# Patient Record
Sex: MALE | Race: WHITE | NOT HISPANIC OR LATINO | Employment: OTHER | URBAN - METROPOLITAN AREA
[De-identification: names, ages, dates, MRNs, and addresses within clinical notes are randomized per-mention and may not be internally consistent; named-entity substitution may affect disease eponyms.]

---

## 2017-02-15 ENCOUNTER — HOSPITAL ENCOUNTER (OUTPATIENT)
Dept: RADIOLOGY | Facility: HOSPITAL | Age: 67
Discharge: HOME | End: 2017-02-15
Attending: INTERNAL MEDICINE
Payer: MEDICARE

## 2017-02-15 ENCOUNTER — OFFICE VISIT (OUTPATIENT)
Dept: PULMONOLOGY | Facility: CLINIC | Age: 67
End: 2017-02-15
Payer: MEDICARE

## 2017-02-15 VITALS
RESPIRATION RATE: 12 BRPM | WEIGHT: 186.94 LBS | BODY MASS INDEX: 25.32 KG/M2 | OXYGEN SATURATION: 99 % | HEIGHT: 72 IN | HEART RATE: 55 BPM

## 2017-02-15 DIAGNOSIS — J84.10 GRANULOMATOUS LUNG DISEASE: ICD-10-CM

## 2017-02-15 DIAGNOSIS — R93.89 ABNORMAL CT SCAN, CHEST: Primary | ICD-10-CM

## 2017-02-15 DIAGNOSIS — J30.9 ALLERGIC RHINITIS, UNSPECIFIED ALLERGIC RHINITIS TRIGGER, UNSPECIFIED RHINITIS SEASONALITY: ICD-10-CM

## 2017-02-15 DIAGNOSIS — R93.89 ABNORMAL CT SCAN, CHEST: ICD-10-CM

## 2017-02-15 DIAGNOSIS — J45.30 MILD PERSISTENT ASTHMA WITHOUT COMPLICATION: ICD-10-CM

## 2017-02-15 PROCEDURE — 99999 PR PBB SHADOW E&M-EST. PATIENT-LVL III: CPT | Mod: PBBFAC,,, | Performed by: INTERNAL MEDICINE

## 2017-02-15 PROCEDURE — 71250 CT THORAX DX C-: CPT | Mod: TC

## 2017-02-15 PROCEDURE — 71250 CT THORAX DX C-: CPT | Mod: 26,,, | Performed by: RADIOLOGY

## 2017-02-15 PROCEDURE — 99214 OFFICE O/P EST MOD 30 MIN: CPT | Mod: S$PBB,,, | Performed by: INTERNAL MEDICINE

## 2017-02-15 NOTE — MR AVS SNAPSHOT
[facility]  [address]  [facility] [address]  Phone: [phone]                  Jeff Prabhakar   2/15/2017 9:00 AM   Office Visit    Description:  Male : 1950   Provider:  GEORGINA Rosenbaum MD   Department:  [facility]           Diagnoses this Visit        Comments    Abnormal CT scan, chest    -  Primary            To Do List           Goals (5 Years of Data)     None      [facility]     [facility] Care [facility] -  Assistance  Registered nurses in the [facility] provide clinical advisement, health education, appointment booking, and other advisory services.  Call for this free service at [phone].             Medications           Message regarding Medications     Verify the changes and/or additions to your medication regime listed below are the same as discussed with your clinician today.  If any of these changes or additions are incorrect, please notify your healthcare provider.             Verify that the below list of medications is an accurate representation of the medications you are currently taking.  If none reported, the list may be blank. If incorrect, please contact your healthcare provider. Carry this list with you in case of emergency.           Current Medications     albuterol 90 mcg/actuation inhaler Inhale 2 puffs into the lungs every 4 (four) hours as needed for Wheezing.    budesonide-formoterol 160-4.5 mcg (SYMBICORT) 160-4.5 mcg/actuation HFAA Inhale 2 puffs into the lungs every 12 (twelve) hours.    fluticasone (FLONASE) 50 mcg/actuation nasal spray 1 spray by Each Nare route 2 (two) times daily.    lisinopril 10 MG tablet Take 10 mg by mouth once daily.    montelukast (SINGULAIR) 10 mg tablet Take 10 mg by mouth every evening.    PRAVASTATIN SODIUM (PRAVASTATIN ORAL) Take by mouth every evening.     albuterol (PROVENTIL) 2.5 mg /3 mL (0.083 %) nebulizer solution Take 2.5 mg by nebulization  every 6 (six) hours as needed for Wheezing.    hydrocodone-acetaminophen 5-325mg (NORCO) 5-325 mg per tablet Take 1 tablet by mouth every 6 (six) hours as needed for Pain.    omeprazole (PRILOSEC) 40 MG capsule Take 40 mg by mouth once daily.           Clinical Reference Information           Your Vitals Were     Pulse Height Weight SpO2 BMI    55 6' (1.829 m) 84.8 kg (186 lb 15.2 oz) 99% 25.35 kg/m2      Allergies as of 2/15/2017     No Known Allergies      Immunizations Administered on Date of Encounter - 2/15/2017     None      Orders Placed During Today's Visit     Future Labs/Procedures Expected by Expires    CT Chest Without Contrast  2/15/2017 2/15/2017      [facility] Sign-Up     Activating your [facility] account is as easy as 1-2-3!     1) Visit [email or web address], select Sign Up Now, enter this activation code and your date of birth, then select Next.  [email or web address]  Expires: 4/1/2017  4:43 PM      2) Create a username and password to use when you visit [facility] in the future and select a security question in case you lose your password and select Next.    3) Enter your e-mail address and click Sign Up!    Additional Information  If you have questions, please e-mail [email or web address] or call [phone] to talk to our [facility] staff. Remember, [facility] is NOT to be used for urgent needs. For medical emergencies, dial 911.         Language Assistance Services     ATTENTION: Language assistance services are available, free of charge. Please call [phone].      ATENCIÓN: Si habla español, tiene a soto disposición servicios gratuitos de asistencia lingüística. Llame al [phone].     NATE Ý: N?u b?n nói Ti?ng Vi?t, có các d?ch v? h? tr? ngôn ng? mi?n phí dành cho b?n. G?i s? [phone].         [facility] complies with applicable Federal civil rights laws and does not discriminate on the basis of race, color, national origin, age, disability, or sex.

## 2017-02-16 NOTE — PROGRESS NOTES
Subjective:       Patient ID: Jeff Prabhakar is a 66 y.o. male.    Chief Complaint: Pulmonary Nodules    HPI HISTORY OF PRESENT ILLNESS:  Mr. Prabhakar is a 66-year-old nonsmoker, who comes for   followup of a previous VATS for investigation of nodular abnormalities within   the right lower lobe.  This procedure was performed in December 2015, and the   resected material revealed a granulomatous histology.  Micro studies from a   previous bronchoscopy were negative for acid-fast bacteria and fungal organisms.    It was felt that this surgical resection included the entire cluster of   nodules, which were present in the superior segment of the right lower lobe.    No followup of therapy was undertaken.  Mr. Prabhakar had a postoperative visit   here in February 2016, and his exam and CT scan at that time did not suggest any   active lung abnormalities.    Today, he reports that during the past year he has continued to feel well.    Within the past several weeks, he did develop an acute respiratory illness   which included upper respiratory congestion, sinus discomfort, and a cough with   mild wheezing and a small amount of nonpurulent sputum.  He was treated by his   physician at home with Zithromax.  He also made increased use of an albuterol   inhaler for control of wheezing.  He feels that his symptoms are now improved.    Mr. Prabhakar remains on maintenance therapy for asthma.  He currently uses   Symbicort 160/4.5 and Singulair.  He also has Flonase nasal spray, which he uses   for treatment of chronic rhinitis.  He has not recognized any adverse effects   related to these medications.      CB/HN  dd: 02/15/2017 20:22:44 (CST)  td: 02/16/2017 11:13:27 (CST)  Doc ID   #[id number]  Job ID #[id number]    CC:       Review of Systems   Constitutional: Negative for fever and fatigue.   HENT: Positive for postnasal drip and congestion. Negative for sinus pressure and voice change.    Respiratory: Positive for cough, sputum production and  wheezing. Negative for shortness of breath and dyspnea on extertion.    Cardiovascular: Negative for chest pain and leg swelling.   Genitourinary: Negative for difficulty urinating.   Musculoskeletal: Negative for arthralgias and back pain.   Skin: Negative for rash.   Gastrointestinal: Negative for abdominal pain and acid reflux.   Neurological: Negative for dizziness and weakness.   Hematological: Negative for adenopathy.       Objective:      Physical Exam   Constitutional: He is oriented to person, place, and time. He appears well-developed and well-nourished.   HENT:   Head: Normocephalic.   Mouth/Throat: Oropharynx is clear and moist. No oropharyngeal exudate.   Neck: Normal range of motion. Neck supple. No JVD present. No tracheal deviation present. No thyromegaly present.   Cardiovascular: Normal rate, regular rhythm and normal heart sounds.    No murmur heard.  Pulmonary/Chest: Normal expansion. No stridor. He has no wheezes. He has no rhonchi. He has no rales. He exhibits no tenderness.   Abdominal: Soft.   Musculoskeletal: He exhibits no edema.   Lymphadenopathy:     He has no cervical adenopathy.   Neurological: He is alert and oriented to person, place, and time.   Skin: Skin is warm and dry. No rash noted. No erythema. Nails show no clubbing.   Psychiatric: He has a normal mood and affect.   Vitals reviewed.    Personal Diagnostic Review    No flowsheet data found.      Assessment:       1. Abnormal CT scan, chest    2. Granulomatous lung disease    3. Mild persistent asthma without complication    4. Allergic rhinitis, unspecified allergic rhinitis trigger, unspecified rhinitis seasonality        Outpatient Encounter Prescriptions as of 2/15/2017   Medication Sig Dispense Refill    albuterol 90 mcg/actuation inhaler Inhale 2 puffs into the lungs every 4 (four) hours as needed for Wheezing. 1 each 12    budesonide-formoterol 160-4.5 mcg (SYMBICORT) 160-4.5 mcg/actuation HFAA Inhale 2 puffs into the  lungs every 12 (twelve) hours.      fluticasone (FLONASE) 50 mcg/actuation nasal spray 1 spray by Each Nare route 2 (two) times daily.      lisinopril 10 MG tablet Take 10 mg by mouth once daily.      montelukast (SINGULAIR) 10 mg tablet Take 10 mg by mouth every evening.      PRAVASTATIN SODIUM (PRAVASTATIN ORAL) Take by mouth every evening.       albuterol (PROVENTIL) 2.5 mg /3 mL (0.083 %) nebulizer solution Take 2.5 mg by nebulization every 6 (six) hours as needed for Wheezing.      hydrocodone-acetaminophen 5-325mg (NORCO) 5-325 mg per tablet Take 1 tablet by mouth every 6 (six) hours as needed for Pain. 42 tablet 0    omeprazole (PRILOSEC) 40 MG capsule Take 40 mg by mouth once daily.       No facility-administered encounter medications on file as of 2/15/2017.      Orders Placed This Encounter   Procedures    CT Chest Without Contrast     Standing Status:   Future     Number of Occurrences:   1     Standing Expiration Date:   2/15/2017     Order Specific Question:   Reason for Exam:     Answer:   ABNL CT SCAN, NON CONTRAST, CHEST     Plan:     CT Chest (phone report).  Continue present respiratory medications (roles reviewed at today's visit).  Call if chest congestion fails to resolve.  If CT stable, then return visit 1 year with Spirometry and CXR.       ADDENDUM:  Patient informed that the CT shows stable post-op changes in area of previous VATS.    No findings to indicate an active pulmonary process. Follow up plan as noted above.

## 2017-02-16 NOTE — PATIENT INSTRUCTIONS
CT Chest (phone report).  Continue present respiratory medications (roles reviewed at today's visit).  Call if chest congestion fails to resolve.  If CT stable, then return visit 1 year with Spirometry and CXR.

## 2017-08-01 DIAGNOSIS — J45.909 UNCOMPLICATED ASTHMA: ICD-10-CM

## 2017-08-02 RX ORDER — ALBUTEROL SULFATE 108 UG/1
AEROSOL, METERED RESPIRATORY (INHALATION)
Qty: 7 EACH | Refills: 6 | Status: SHIPPED | OUTPATIENT
Start: 2017-08-02 | End: 2018-08-24 | Stop reason: SDUPTHER

## 2018-08-22 ENCOUNTER — TELEPHONE (OUTPATIENT)
Dept: PULMONOLOGY | Facility: CLINIC | Age: 68
End: 2018-08-22

## 2018-08-22 DIAGNOSIS — R91.8 ABNORMAL CT LUNG SCREENING: Primary | ICD-10-CM

## 2018-08-24 ENCOUNTER — HOSPITAL ENCOUNTER (OUTPATIENT)
Dept: PULMONOLOGY | Facility: CLINIC | Age: 68
Discharge: HOME | End: 2018-08-24
Payer: MEDICARE

## 2018-08-24 ENCOUNTER — OFFICE VISIT (OUTPATIENT)
Dept: PULMONOLOGY | Facility: CLINIC | Age: 68
End: 2018-08-24
Payer: MEDICARE

## 2018-08-24 ENCOUNTER — HOSPITAL ENCOUNTER (OUTPATIENT)
Dept: RADIOLOGY | Facility: HOSPITAL | Age: 68
Discharge: HOME | End: 2018-08-24
Attending: HOSPITALIST
Payer: MEDICARE

## 2018-08-24 VITALS
HEART RATE: 52 BPM | SYSTOLIC BLOOD PRESSURE: 130 MMHG | OXYGEN SATURATION: 97 % | WEIGHT: 188 LBS | BODY MASS INDEX: 25.47 KG/M2 | HEIGHT: 72 IN | DIASTOLIC BLOOD PRESSURE: 88 MMHG

## 2018-08-24 DIAGNOSIS — R91.8 ABNORMAL CT LUNG SCREENING: ICD-10-CM

## 2018-08-24 DIAGNOSIS — J84.10 GRANULOMATOUS LUNG DISEASE: Primary | ICD-10-CM

## 2018-08-24 DIAGNOSIS — J45.20 MILD INTERMITTENT ASTHMA WITHOUT COMPLICATION: ICD-10-CM

## 2018-08-24 DIAGNOSIS — J30.9 ALLERGIC RHINITIS, UNSPECIFIED SEASONALITY, UNSPECIFIED TRIGGER: ICD-10-CM

## 2018-08-24 LAB
POST FEV1 FVC: 0.81
POST FEV1: 3.39
POST FVC: 4.18
PRE FEV1 FVC: 83
PRE FEV1: 3.22
PRE FVC: 3.87
PREDICTED FEV1 FVC: 79
PREDICTED FEV1: 3.56
PREDICTED FVC: 4.46

## 2018-08-24 PROCEDURE — 71046 X-RAY EXAM CHEST 2 VIEWS: CPT | Mod: 26,,, | Performed by: RADIOLOGY

## 2018-08-24 PROCEDURE — 94060 EVALUATION OF WHEEZING: CPT | Mod: PBBFAC | Performed by: INTERNAL MEDICINE

## 2018-08-24 PROCEDURE — 99213 OFFICE O/P EST LOW 20 MIN: CPT | Mod: PBBFAC,25 | Performed by: HOSPITALIST

## 2018-08-24 PROCEDURE — 71046 X-RAY EXAM CHEST 2 VIEWS: CPT | Mod: TC,FY

## 2018-08-24 PROCEDURE — 99213 OFFICE O/P EST LOW 20 MIN: CPT | Mod: 25,S$PBB,GC, | Performed by: HOSPITALIST

## 2018-08-24 PROCEDURE — 99999 PR PBB SHADOW E&M-EST. PATIENT-LVL III: CPT | Mod: PBBFAC,GC,, | Performed by: HOSPITALIST

## 2018-08-24 RX ORDER — ALBUTEROL SULFATE 90 UG/1
2 AEROSOL, METERED RESPIRATORY (INHALATION) EVERY 6 HOURS PRN
Qty: 7 EACH | Refills: 6 | Status: SHIPPED | OUTPATIENT
Start: 2018-08-24 | End: 2020-11-23 | Stop reason: SDUPTHER

## 2018-08-24 RX ORDER — FLUTICASONE FUROATE AND VILANTEROL 100; 25 UG/1; UG/1
1 POWDER RESPIRATORY (INHALATION) DAILY
Status: DISCONTINUED | OUTPATIENT
Start: 2018-08-25 | End: 2018-08-28

## 2018-08-24 RX ORDER — FLUTICASONE PROPIONATE 50 MCG
1 SPRAY, SUSPENSION (ML) NASAL 2 TIMES DAILY
Qty: 18.2 ML | Refills: 3 | Status: SHIPPED | OUTPATIENT
Start: 2018-08-24 | End: 2020-11-23 | Stop reason: SDUPTHER

## 2018-08-24 RX ORDER — MONTELUKAST SODIUM 10 MG/1
10 TABLET ORAL NIGHTLY
Qty: 90 TABLET | Refills: 3 | Status: SHIPPED | OUTPATIENT
Start: 2018-08-24 | End: 2019-05-06

## 2018-08-24 RX ORDER — ALBUTEROL SULFATE 0.83 MG/ML
2.5 SOLUTION RESPIRATORY (INHALATION) EVERY 6 HOURS PRN
Qty: 300 ML | Refills: 3 | Status: SHIPPED | OUTPATIENT
Start: 2018-08-24 | End: 2020-11-23 | Stop reason: SDUPTHER

## 2018-08-24 RX ORDER — BUDESONIDE AND FORMOTEROL FUMARATE DIHYDRATE 160; 4.5 UG/1; UG/1
2 AEROSOL RESPIRATORY (INHALATION) EVERY 12 HOURS
Qty: 10.2 G | Refills: 3 | Status: SHIPPED | OUTPATIENT
Start: 2018-08-24 | End: 2018-08-24

## 2018-08-24 NOTE — PROGRESS NOTES
[facility]  Follow-up Consultation Note    [facility]   [address]   [place] [address]    Chief Complaint/Reason for Visit:  Lung nodules, ?asthma, ?Sarcoid      Brief Clinical Summary:  67yoM with hx of a cluster of nodules in his RLL s/p VATS wedge bx and identified as non-caseating granulomatous disease. Has a hx of asthma dx ~7-8 years ago on Symbicort with good control (hx of recurrent episodes of 'bronchitis' since childhood, airway sensitivity by history)      Interval History:  Doing well from a respiratory perspective.  Denies any recent wheezing of dyspnea.  No hemoptysis, cough, fevers, chills.  Taking Symbicort, Singulair daily.  Has used albuterol ~1x in the last month (when putting up dry wall).  Has a hx of bronchitis since childhood (recurrent, often yearly) that has improved substantially since being on Symbicort.  He has no PFTs that substantiate the dx of asthma but clinically has a history suggestive of it (sensitivity to certain inhaled substances).  Has sinus issues with post-nasal drip controlled w/ Flonase and sinus rinses.  Denies any reflux sx (was previously on PPI).  Had a recent ophtho appointment where his ophthalmologist suggested he saw something on his retinal exam suggestive of histo.     has a past medical history of Arthritis, Asthma, Diverticulitis, Hyperlipidemia, Hypertension, PONV (postoperative nausea and vomiting), and Reflux.    Past medical, surgical, social and family histories have been updated in the EMR    Allergies: Reviewed    Current medications have been reviewed    Current pulmonary regimen :   Albuterol PRN  Symbicort BID  Singulair  Flonase    On examination:  /88   Pulse (!) 52   Ht 6' (1.829 m)   Wt 85.3 kg (188 lb)   SpO2 97%   BMI 25.50 kg/m²   Physical Exam  General: Awake, conversant without increased WOB  HEENT: EOMI, PERRL  Recent laboratory and radiographic results have been reviewed  Notable  results include:   Most recent PFT: No obstruction, no restriction suggested by FVC.   Most recent echocardiogram: Normal EF, no DD  Most recent right heart catheterization: N/A  Previous biopsy results:     I have personally reviewed and interpreted the following studies:  CT Chest - Post surgical changes - calcification in liver    Assessment and Plan:    Jeff Prabhakar is a 67 y.o. male who was seen today for evaluation of: Lung nodules, Asthma    .  1. Granulomatous lung disease    2. Uncomplicated asthma    3. Allergic rhinitis, unspecified seasonality, unspecified trigger      1. 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  No indication to follow it further.  No strong suggestion by history of sarcoidosis.  Will monitor clinically for decompensation.    2. No obstruction on PFTs, no BD response but presently not exacerbating.  Clinically strongly suggestive of asthma and improved on Symbicort.  Do not feel methacholine challenge is warranted.  Would like to de-escalate therapy (taking BID drug not optimal from his perspective).  Will try to switch to Breo if insurance permits.  If not possible, he is fine with reducing Symbicort dose and seeing how he does.  Use Albuterol PRN.  Continue Singulair and Flonase.    3. Flonase and sinus rinse as noted above.    Return to clinic: 6 months  Plan was discussed with staff pulmonologist Dr. Raul Noble MD  [facility]

## 2018-08-28 RX ORDER — FLUTICASONE FUROATE AND VILANTEROL 100; 25 UG/1; UG/1
1 POWDER RESPIRATORY (INHALATION) DAILY
Qty: 1 EACH | Refills: 3 | Status: SHIPPED | OUTPATIENT
Start: 2018-08-28 | End: 2018-10-26 | Stop reason: ALTCHOICE

## 2018-10-25 ENCOUNTER — TELEPHONE (OUTPATIENT)
Dept: PULMONOLOGY | Facility: CLINIC | Age: 68
End: 2018-10-25

## 2018-10-26 RX ORDER — BUDESONIDE AND FORMOTEROL FUMARATE DIHYDRATE 160; 4.5 UG/1; UG/1
2 AEROSOL RESPIRATORY (INHALATION) EVERY 12 HOURS
Qty: 1 INHALER | Refills: 6 | Status: SHIPPED | OUTPATIENT
Start: 2018-10-26 | End: 2019-05-06 | Stop reason: SDUPTHER

## 2018-11-30 DIAGNOSIS — J45.909 UNCOMPLICATED ASTHMA: ICD-10-CM

## 2018-11-30 RX ORDER — ALBUTEROL SULFATE 108 UG/1
AEROSOL, METERED RESPIRATORY (INHALATION)
Qty: 7 EACH | Refills: 6 | OUTPATIENT
Start: 2018-11-30

## 2019-05-06 ENCOUNTER — TELEPHONE (OUTPATIENT)
Dept: PULMONOLOGY | Facility: HOSPITAL | Age: 69
End: 2019-05-06

## 2019-05-06 RX ORDER — MONTELUKAST SODIUM 10 MG/1
10 TABLET ORAL NIGHTLY
Qty: 30 TABLET | Refills: 11 | Status: SHIPPED | OUTPATIENT
Start: 2019-05-06 | End: 2019-06-05

## 2019-05-06 RX ORDER — BUDESONIDE AND FORMOTEROL FUMARATE DIHYDRATE 160; 4.5 UG/1; UG/1
2 AEROSOL RESPIRATORY (INHALATION) EVERY 12 HOURS
Qty: 1 INHALER | Refills: 6 | Status: SHIPPED | OUTPATIENT
Start: 2019-05-06 | End: 2019-10-25 | Stop reason: SDUPTHER

## 2019-10-21 DIAGNOSIS — R06.00 DYSPNEA, UNSPECIFIED TYPE: Primary | ICD-10-CM

## 2019-10-25 ENCOUNTER — HOSPITAL ENCOUNTER (OUTPATIENT)
Dept: PULMONOLOGY | Facility: CLINIC | Age: 69
Discharge: HOME | End: 2019-10-25
Payer: MEDICARE

## 2019-10-25 ENCOUNTER — OFFICE VISIT (OUTPATIENT)
Dept: PULMONOLOGY | Facility: CLINIC | Age: 69
End: 2019-10-25
Payer: MEDICARE

## 2019-10-25 VITALS
BODY MASS INDEX: 26.18 KG/M2 | HEART RATE: 54 BPM | DIASTOLIC BLOOD PRESSURE: 79 MMHG | HEIGHT: 71 IN | SYSTOLIC BLOOD PRESSURE: 139 MMHG | WEIGHT: 187 LBS | OXYGEN SATURATION: 96 %

## 2019-10-25 DIAGNOSIS — J84.10 GRANULOMATOUS LUNG DISEASE: ICD-10-CM

## 2019-10-25 DIAGNOSIS — R06.00 DYSPNEA, UNSPECIFIED TYPE: ICD-10-CM

## 2019-10-25 DIAGNOSIS — J45.20 MILD INTERMITTENT ASTHMA WITHOUT COMPLICATION: Primary | ICD-10-CM

## 2019-10-25 DIAGNOSIS — K21.9 GASTROESOPHAGEAL REFLUX DISEASE WITHOUT ESOPHAGITIS: ICD-10-CM

## 2019-10-25 DIAGNOSIS — J30.9 ALLERGIC RHINITIS, UNSPECIFIED SEASONALITY, UNSPECIFIED TRIGGER: ICD-10-CM

## 2019-10-25 PROCEDURE — 99213 OFFICE O/P EST LOW 20 MIN: CPT | Mod: PBBFAC,25

## 2019-10-25 PROCEDURE — 94010 BREATHING CAPACITY TEST: ICD-10-PCS | Mod: 26,S$PBB,, | Performed by: INTERNAL MEDICINE

## 2019-10-25 PROCEDURE — 94010 BREATHING CAPACITY TEST: CPT | Mod: PBBFAC | Performed by: INTERNAL MEDICINE

## 2019-10-25 PROCEDURE — 94729 DIFFUSING CAPACITY: CPT | Mod: 26,S$PBB,, | Performed by: INTERNAL MEDICINE

## 2019-10-25 PROCEDURE — 94727 PR PULM FUNCTION TEST BY GAS: ICD-10-PCS | Mod: 26,S$PBB,, | Performed by: INTERNAL MEDICINE

## 2019-10-25 PROCEDURE — 99999 PR PBB SHADOW E&M-EST. PATIENT-LVL III: CPT | Mod: PBBFAC,GC,,

## 2019-10-25 PROCEDURE — 94729 PR C02/MEMBANE DIFFUSE CAPACITY: ICD-10-PCS | Mod: 26,S$PBB,, | Performed by: INTERNAL MEDICINE

## 2019-10-25 PROCEDURE — 94010 BREATHING CAPACITY TEST: CPT | Mod: 26,S$PBB,, | Performed by: INTERNAL MEDICINE

## 2019-10-25 PROCEDURE — 99214 PR OFFICE/OUTPT VISIT, EST, LEVL IV, 30-39 MIN: ICD-10-PCS | Mod: 25,S$PBB,GC,

## 2019-10-25 PROCEDURE — 94727 GAS DIL/WSHOT DETER LNG VOL: CPT | Mod: 26,S$PBB,, | Performed by: INTERNAL MEDICINE

## 2019-10-25 PROCEDURE — 94727 GAS DIL/WSHOT DETER LNG VOL: CPT | Mod: PBBFAC | Performed by: INTERNAL MEDICINE

## 2019-10-25 PROCEDURE — 99999 PR PBB SHADOW E&M-EST. PATIENT-LVL III: ICD-10-PCS | Mod: PBBFAC,GC,,

## 2019-10-25 PROCEDURE — 99214 OFFICE O/P EST MOD 30 MIN: CPT | Mod: 25,S$PBB,GC,

## 2019-10-25 PROCEDURE — 94729 DIFFUSING CAPACITY: CPT | Mod: PBBFAC | Performed by: INTERNAL MEDICINE

## 2019-10-25 RX ORDER — BUDESONIDE AND FORMOTEROL FUMARATE DIHYDRATE 160; 4.5 UG/1; UG/1
2 AEROSOL RESPIRATORY (INHALATION) EVERY 12 HOURS
Qty: 1 INHALER | Refills: 6 | Status: SHIPPED | OUTPATIENT
Start: 2019-10-25 | End: 2020-07-08 | Stop reason: SDUPTHER

## 2019-10-25 RX ORDER — MONTELUKAST SODIUM 10 MG/1
10 TABLET ORAL NIGHTLY
Refills: 11 | COMMUNITY
Start: 2019-10-06 | End: 2019-10-25 | Stop reason: SDUPTHER

## 2019-10-25 RX ORDER — MONTELUKAST SODIUM 10 MG/1
10 TABLET ORAL NIGHTLY
Qty: 30 TABLET | Refills: 11 | Status: SHIPPED | OUTPATIENT
Start: 2019-10-25 | End: 2020-11-23 | Stop reason: SDUPTHER

## 2019-10-25 RX ORDER — FLUTICASONE FUROATE AND VILANTEROL TRIFENATATE 100; 25 UG/1; UG/1
1 POWDER RESPIRATORY (INHALATION) DAILY
Refills: 3 | COMMUNITY
Start: 2019-08-26 | End: 2019-10-25

## 2019-10-25 NOTE — PROGRESS NOTES
Subjective:       Patient ID: Jeff Prabhakar is a 68 y.o. male.    Chief Complaint: Shortness of Breath    HPI   Mr. Prabhakar is a 68 year old male with past medical history of RLL lung nodules biopsied by VATS (resulted in non-caseating granulomatous disease), asthma (diagnosed in childhood, but now relatively asymptomatic), recurrent bronchitis, who presents as a follow up for the above.      At his last visit his asthma control was de-escalated and he is doing well.  Today, he is taking symbicort, PRN albuterol, singulair, and flonase.  He also takes a PPI for GERD.  In June, he was treated for bronchitis with azithromycin and then amoxicillin with nebulizer therapy and recovered.     ACT = 21 today    Occupational history - retired   Sick contacts - no  Tb exposure - no  Travel history - no  Pets - dog  Aspiration - no  ITZEL - no    Review of Systems   Constitutional: Negative for fever, chills, weight gain, activity change, appetite change, fatigue, night sweats and weakness.   HENT: Positive for postnasal drip and congestion. Negative for nosebleeds, rhinorrhea, sinus pressure, sore throat and trouble swallowing.    Respiratory: Positive for wheezing. Negative for apnea, snoring, cough, hemoptysis, sputum production, choking, chest tightness, shortness of breath, orthopnea, previous hospitialization due to pulmonary problems, asthma nighttime symptoms, pleurisy, dyspnea on extertion, use of rescue inhaler, somnolence and Paroxysmal Nocturnal Dyspnea.    Cardiovascular: Negative for chest pain, palpitations and leg swelling.   Musculoskeletal: Negative for arthralgias.   Gastrointestinal: Negative for nausea, vomiting, abdominal pain and abdominal distention.   Neurological: Negative for dizziness.   Psychiatric/Behavioral: Negative for confusion.       Objective:      Physical Exam   Constitutional: He is oriented to person, place, and time. He appears well-developed and well-nourished. He  appears not cachectic. No distress.   HENT:   Head: Normocephalic.   Nose: Nose normal.   Mouth/Throat: Mallampati Score: III.   Neck: Normal range of motion. Neck supple. No JVD present.   Cardiovascular: Normal rate, regular rhythm, normal heart sounds and intact distal pulses. Exam reveals no gallop and no friction rub.   No murmur heard.  Pulmonary/Chest: Normal expansion, symmetric chest wall expansion, effort normal and breath sounds normal. No respiratory distress. He has no decreased breath sounds. He has no wheezes. He has no rhonchi. He has no rales. Chest wall is not dull to percussion. He exhibits no tenderness. Negative for egophony. Negative for tactile fremitus.   Abdominal: Soft. Bowel sounds are normal. He exhibits no distension. There is no hepatosplenomegaly.   Musculoskeletal: Normal range of motion. He exhibits no edema.   Lymphadenopathy:     He has no axillary adenopathy.   Neurological: He is alert and oriented to person, place, and time.   Skin: Skin is warm and dry.   Psychiatric: He has a normal mood and affect. His behavior is normal. Judgment and thought content normal.     Personal Diagnostic Review    PFT 10/25/2019  FEV1 3.42L (101%)  TLC 79%  DLCO 77%    No flowsheet data found.      Assessment:       1. Mild intermittent asthma without complication    2. Gastroesophageal reflux disease without esophagitis    3. Granulomatous lung disease    4. Allergic rhinitis, unspecified seasonality, unspecified trigger        Outpatient Encounter Medications as of 10/25/2019   Medication Sig Dispense Refill    albuterol (PROVENTIL HFA) 90 mcg/actuation inhaler Inhale 2 puffs into the lungs every 6 (six) hours as needed for Wheezing. Rescue 7 each 6    albuterol (PROVENTIL) 2.5 mg /3 mL (0.083 %) nebulizer solution Take 3 mLs (2.5 mg total) by nebulization every 6 (six) hours as needed for Wheezing. 300 mL 3    budesonide-formoterol 160-4.5 mcg (SYMBICORT) 160-4.5 mcg/actuation HFAA Inhale 2  puffs into the lungs every 12 (twelve) hours. Controller 1 Inhaler 6    fluticasone (FLONASE) 50 mcg/actuation nasal spray 1 spray (50 mcg total) by Each Nare route 2 (two) times daily. 18.2 mL 3    FLUZONE HIGH-DOSE 2019-20, PF, 180 mcg/0.5 mL Syrg PHARMACIST ADMINISTERED IMMUNIZATION ADMINISTERED AT TIME OF DISPENSING  0    hydrocodone-acetaminophen 5-325mg (NORCO) 5-325 mg per tablet Take 1 tablet by mouth every 6 (six) hours as needed for Pain. 42 tablet 0    lisinopril 10 MG tablet Take 10 mg by mouth once daily.      montelukast (SINGULAIR) 10 mg tablet Take 1 tablet (10 mg total) by mouth every evening. 30 tablet 11    omeprazole (PRILOSEC) 40 MG capsule Take 40 mg by mouth once daily.      PRAVASTATIN SODIUM (PRAVASTATIN ORAL) Take by mouth every evening.       [DISCONTINUED] BREO ELLIPTA 100-25 mcg/dose diskus inhaler 1 puff once daily.  3    [DISCONTINUED] budesonide-formoterol 160-4.5 mcg (SYMBICORT) 160-4.5 mcg/actuation HFAA Inhale 2 puffs into the lungs every 12 (twelve) hours. Controller 1 Inhaler 6    [DISCONTINUED] montelukast (SINGULAIR) 10 mg tablet Take 10 mg by mouth every evening.  11     No facility-administered encounter medications on file as of 10/25/2019.      No orders of the defined types were placed in this encounter.      Plan:       1) asthma mild persistent (normal eosinophils)  2) Pulmonary nodules (non-caseating granulomatous disease by VATS biopsy)  3) GERD  4) Recurrent bronchitis    - continue symbicort and PRN albuterol as he has good control of his disease.  Continue singulair, flonase.  Refilled singulair and symbicort today.  - continue monitoring of pulmonary nodules.  Last CT was 2/2017 and did not show changes of lesions in question.  No further follow up needed unless there is clinical change.  - control symptoms of GERD  - flu shot received this year already    - RTC 1 year or sooner if needed.    Ryan Keen, MD [facility] PGY5  [phone]

## 2019-10-28 LAB
DLCO ADJ PRE: 22.09 ML/(MIN*MMHG) (ref 21.58–35.43)
DLCO SINGLE BREATH LLN: 21.58
DLCO SINGLE BREATH PRE REF: 77.5 %
DLCO SINGLE BREATH REF: 28.5
DLCOC SBVA LLN: 2.77
DLCOC SBVA PRE REF: 94 %
DLCOC SBVA REF: 3.9
DLCOC SINGLE BREATH LLN: 21.58
DLCOC SINGLE BREATH PRE REF: 77.5 %
DLCOC SINGLE BREATH REF: 28.5
DLCOCSBVAULN: 5.04
DLCOCSINGLEBREATHULN: 35.43
DLCOSINGLEBREATHULN: 35.43
DLCOVA LLN: 2.77
DLCOVA PRE REF: 94 %
DLCOVA PRE: 3.67 ML/(MIN*MMHG*L) (ref 2.77–5.04)
DLCOVA REF: 3.9
DLCOVAULN: 5.04
DLVAADJ PRE: 3.67 ML/(MIN*MMHG*L) (ref 2.77–5.04)
ERVN2 LLN: 1.12
ERVN2 PRE REF: 124.7 %
ERVN2 PRE: 1.4 L (ref 1.12–1.12)
ERVN2 REF: 1.12
ERVN2ULN: 1.12
FEF 25 75 LLN: 1.17
FEF 25 75 PRE REF: 138.5 %
FEF 25 75 REF: 2.61
FEV05 LLN: 1.64
FEV05 REF: 2.77
FEV1 FVC LLN: 63
FEV1 FVC PRE REF: 105.7 %
FEV1 FVC REF: 76
FEV1 LLN: 2.47
FEV1 PRE REF: 100.9 %
FEV1 REF: 3.39
FRCN2 LLN: 2.74
FRCN2 PRE REF: 75.4 %
FRCN2 REF: 3.72
FRCN2ULN: 4.71
FVC LLN: 3.33
FVC PRE REF: 95.2 %
FVC REF: 4.46
IVC PRE: 4.09 L (ref 3.33–5.59)
IVC SINGLE BREATH LLN: 3.33
IVC SINGLE BREATH PRE REF: 91.6 %
IVC SINGLE BREATH REF: 4.46
IVCSINGLEBREATHULN: 5.59
PEF LLN: 6.47
PEF PRE REF: 99.9 %
PEF REF: 8.84
PRE DLCO: 22.09 ML/(MIN*MMHG) (ref 21.58–35.43)
PRE FEF 25 75: 3.61 L/S (ref 1.17–4.05)
PRE FET 100: 6.44 SEC
PRE FEV05 REF: 102.3 %
PRE FEV1 FVC: 80.46 % (ref 63.09–89.2)
PRE FEV1: 3.42 L (ref 2.47–4.3)
PRE FEV5: 2.84 L (ref 1.64–3.91)
PRE FRC N2: 2.81 L
PRE FVC: 4.25 L (ref 3.33–5.59)
PRE PEF: 8.83 L/S (ref 6.47–11.22)
RVN2 LLN: 1.93
RVN2 PRE REF: 54.2 %
RVN2 PRE: 1.41 L (ref 1.93–3.28)
RVN2 REF: 2.6
RVN2TLCN2 LLN: 31.11
RVN2TLCN2 PRE REF: 60.6 %
RVN2TLCN2 PRE: 24.31 % (ref 31.11–49.07)
RVN2TLCN2 REF: 40.09
RVN2TLCN2ULN: 49.07
RVN2ULN: 3.28
TLCN2 LLN: 6.15
TLCN2 PRE REF: 79.4 %
TLCN2 PRE: 5.8 L (ref 6.15–8.45)
TLCN2 REF: 7.3
TLCN2ULN: 8.45
VA PRE: 6.02 L (ref 7.15–7.15)
VA SINGLE BREATH LLN: 7.15
VA SINGLE BREATH PRE REF: 84.2 %
VA SINGLE BREATH REF: 7.15
VASINGLEBREATHULN: 7.15
VCMAXN2 LLN: 3.33
VCMAXN2 PRE REF: 98.4 %
VCMAXN2 PRE: 4.39 L (ref 3.33–5.59)
VCMAXN2 REF: 4.46
VCMAXN2ULN: 5.59

## 2019-10-29 NOTE — PROGRESS NOTES
I have reviewed the notes, assessments, and/or procedures performed by Dr. hardwick, I concur with her/his documentation of Jeff Prabhakar.

## 2020-07-08 DIAGNOSIS — J45.20 MILD INTERMITTENT ASTHMA WITHOUT COMPLICATION: ICD-10-CM

## 2020-07-08 RX ORDER — BUDESONIDE AND FORMOTEROL FUMARATE DIHYDRATE 160; 4.5 UG/1; UG/1
2 AEROSOL RESPIRATORY (INHALATION) EVERY 12 HOURS
Qty: 1 INHALER | Refills: 6 | Status: SHIPPED | OUTPATIENT
Start: 2020-07-08 | End: 2020-11-23

## 2020-07-08 NOTE — TELEPHONE ENCOUNTER
----- Message from Anne Aguilera sent at 7/8/2020 10:59 AM CDT -----  Pt needs a refill on the following medication:  -budesonide-formoterol 160-4.5 mcg (SYMBICORT) 160-4.5 mcg/actuation [facility] [address] [place] [address] ROAD  [phone] (Phone)[phone] (Fax)       Please call pt once script is sent over.     Contact Info [phone] (home)

## 2020-10-09 ENCOUNTER — TELEPHONE (OUTPATIENT)
Dept: PULMONOLOGY | Facility: CLINIC | Age: 70
End: 2020-10-09

## 2020-10-09 DIAGNOSIS — J45.909 ASTHMA, UNSPECIFIED ASTHMA SEVERITY, UNSPECIFIED WHETHER COMPLICATED, UNSPECIFIED WHETHER PERSISTENT: Primary | ICD-10-CM

## 2020-11-17 ENCOUNTER — TELEPHONE (OUTPATIENT)
Dept: PULMONOLOGY | Facility: CLINIC | Age: 70
End: 2020-11-17

## 2020-11-17 NOTE — TELEPHONE ENCOUNTER
Spoke with patient, informed him that I have received his medication refill request. I advised patient that he should schedule a follow up appointment with physician being that patient has not been in clinic in a year. Patient verbalized that he understand. Patient appointment has been scheduled with Dr Srinivasan on 11/23/20. Patient verbalized that he understand and excepted the appointment.

## 2020-11-17 NOTE — TELEPHONE ENCOUNTER
----- Message from Jenny Rudolph MA sent at 11/17/2020  2:26 PM CST -----  Contact: [phone] pts#  Prev pt of Dr Mariscal, would Dr Jay be able to refill his prescription for montelukast (SINGULAIR) 10 mg tablet      [facility] [address] [place] [address] ROAD [phone] (Phone)  [phone] (Fax)    [phone] pts#

## 2020-11-22 NOTE — PROGRESS NOTES
Pulmonary & Critical Care Medicine   Clinic Note      HPI:  New to me. Followed in fellows clinic in past. 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       At his last visit his asthma control was de-escalated and he is doing well.  Today, he is taking symbicort, PRN albuterol, singulair, and flonase.  He also takes a PPI for GERD.  In June, he was treated for bronchitis with azithromycin and then amoxicillin with nebulizer therapy and recovered.      ACT = 21 today     Occupational history - retired   Sick contacts - no  Tb exposure - no  Travel history - no  Pets - dog  Aspiration - no  ITZEL - no    Interval History:   Doing well. No hospitalizations. No corticosteroid use. Enjoying [facility]. Remains active. Weight stable. Infrequent rescue albuterol use. Symptoms well controlled. No night time awakening. No additional complaints or perceived respiratory limitations.       Past Medical History:   Diagnosis Date    Arthritis     Asthma     Diverticulitis     Hyperlipidemia     Hypertension     PONV (postoperative nausea and vomiting)     with first colonoscopy    Reflux      Past Surgical History:   Procedure Laterality Date    COLONOSCOPY W/ POLYPECTOMY      three times     Family/Social History: Reviewed and updated.        Drug Allergies:   Review of patient's allergies indicates:  No Known Allergies    Review of Systems:   Constitutional: Negative for fever, chills, diaphoresis, activity change, appetite change and fatigue.   HENT: Negative for congestion, drooling, facial swelling, hearing loss, rhinorrhea, sinus pressure, tinnitus, trouble swallowing and voice change.    Eyes: Negative for photophobia, pain and visual disturbance.   Respiratory: Negative for cough,  chest tightness and shortness of breath.    Cardiovascular: Negative for chest pain, palpitations and leg swelling.   Gastrointestinal: Negative for nausea, vomiting, abdominal pain, diarrhea and abdominal distention.   Endocrine: Negative for cold intolerance, heat intolerance, polydipsia and polyuria.   Genitourinary: Negative for dysuria, frequency and hematuria.   Musculoskeletal: Negative for myalgias, back pain, arthralgias, neck pain and neck stiffness.   Skin: Negative for color change, pallor, rash and wound.   Allergic/Immunologic: Negative for immunocompromised state.   Neurological: Negative for dizziness, weakness and headaches.    A comprehensive 12-point review of systems was performed, and is negative except for those items mentioned above in the HPI section of this note.     Physical Exam:   GEN- NAD AAOx3 Well Built, Well Appearing   HEENT- ATNC, PERRLA, EOMI, OP-Cl. No JVD, LAD or bruit noted. Trachea Midline.   CV- RRR No M/R/G  RESP- CTA-Bilateral   GI- S/NT/ND. Positive BS X 4. No HSM Noted  BACK- Spine midline. No step off, crepitus or deformity noted. No midline TTP.   Ext- MAEW, No deformity. No edema or rashes noted.   Neuro- Strength 5/5 symmetric. CN 2-12 intact. Normal gait. Normal sensation.        Personal Review and Summary of Prior Diagnostics    Laboratory Studies: Reviewed. No labs since 2015     Microbiology Data: None. Bronchoscopy studies 2015- Nl.     Summary of Chest Imaging Personally Reviewed:     CT 2017- .  Postsurgical change of VATS procedure and wedge resection with fibrotic scar and suture material in the superior segment of the right lower lobe. No new lesions are identified.  2. Moderate calcific atherosclerosis of the coronary arteries in a 3 vessel distribution.  3. Several right hepatic lobe hypodensities as well as a right hepatic lobe calcification unchanged in size and appearance from prior examination dated 4/30/2012.    CXR 2018- The lungs are clear, with  normal appearance of pulmonary vasculature and no pleural effusion or pneumothorax.  The cardiac silhouette is normal in size. The hilar and mediastinal contours are unremarkable.  Degenerative changes of the spine.    2D Echo: 2015      1 - Normal left ventricular systolic function (EF 60-65%).     2 - Normal left ventricular diastolic function.     3 - Normal right ventricular systolic function .     PFT's:     FEV1/FVC- 80  FEV1- 3.42L (100%)   FVC- 4.25L (95%)   TLC- 79   DLCO- 77       FEV1/FVC- 85  FEV1- 3.35L (100%)  FVC- 3.95L (89%)   TLC- 70   DLCO- 87     No BD response.       Assessment:       No diagnosis found.    Outpatient Encounter Medications as of 11/23/2020   Medication Sig Dispense Refill    albuterol (PROVENTIL HFA) 90 mcg/actuation inhaler Inhale 2 puffs into the lungs every 6 (six) hours as needed for Wheezing. Rescue 7 each 6    albuterol (PROVENTIL) 2.5 mg /3 mL (0.083 %) nebulizer solution Take 3 mLs (2.5 mg total) by nebulization every 6 (six) hours as needed for Wheezing. 300 mL 3    budesonide-formoterol 160-4.5 mcg (SYMBICORT) 160-4.5 mcg/actuation HFAA Inhale 2 puffs into the lungs every 12 (twelve) hours. Controller 1 Inhaler 6    fluticasone (FLONASE) 50 mcg/actuation nasal spray 1 spray (50 mcg total) by Each Nare route 2 (two) times daily. 18.2 mL 3    FLUZONE HIGH-DOSE 2019-20, PF, 180 mcg/0.5 mL Syrg PHARMACIST ADMINISTERED IMMUNIZATION ADMINISTERED AT TIME OF DISPENSING  0    hydrocodone-acetaminophen 5-325mg (NORCO) 5-325 mg per tablet Take 1 tablet by mouth every 6 (six) hours as needed for Pain. 42 tablet 0    lisinopril 10 MG tablet Take 10 mg by mouth once daily.      montelukast (SINGULAIR) 10 mg tablet Take 1 tablet (10 mg total) by mouth every evening. 30 tablet 11    omeprazole (PRILOSEC) 40 MG capsule Take 40 mg by mouth once daily.      PRAVASTATIN SODIUM (PRAVASTATIN ORAL) Take by mouth every evening.        No facility-administered encounter medications on  file as of 11/23/2020.      No orders of the defined types were placed in this encounter.      Plan:       1. Pulmonary nodules- VATS s/p wedge resection. Granulomatous disease. Cultures unrevealing. Never smoker. No chest imaging since 2018. Monitor   2. Asthma- no recent exacerbations. Doing well. -- De-escalate therapy. Transition to LABA ICS (symbicort 80) + singulair + prn HEIDI. Vaccinations UTD.   3. Recurrent bronchitis- no evidence of AE today. Asthma management per above   4. GERD- controlled. Continue PPI     Overall PFts fairly stable. Slightly decreased TLC, but improved DLCO?? Asymptomatic.. Plan for clinic follow up in 1 year. Will re-evaluate at that time. Call in the interim if any changes.     Oscar Srinivasan M.D.   [facility]

## 2020-11-23 ENCOUNTER — HOSPITAL ENCOUNTER (OUTPATIENT)
Dept: PULMONOLOGY | Facility: CLINIC | Age: 70
Discharge: HOME | End: 2020-11-23
Payer: MEDICARE

## 2020-11-23 ENCOUNTER — OFFICE VISIT (OUTPATIENT)
Dept: PULMONOLOGY | Facility: CLINIC | Age: 70
End: 2020-11-23
Payer: MEDICARE

## 2020-11-23 VITALS
WEIGHT: 191 LBS | SYSTOLIC BLOOD PRESSURE: 151 MMHG | OXYGEN SATURATION: 94 % | BODY MASS INDEX: 26.74 KG/M2 | HEIGHT: 71 IN | DIASTOLIC BLOOD PRESSURE: 66 MMHG | HEART RATE: 57 BPM

## 2020-11-23 DIAGNOSIS — J45.909 ASTHMA, UNSPECIFIED ASTHMA SEVERITY, UNSPECIFIED WHETHER COMPLICATED, UNSPECIFIED WHETHER PERSISTENT: ICD-10-CM

## 2020-11-23 DIAGNOSIS — Z13.9 SCREENING PROCEDURE: Primary | ICD-10-CM

## 2020-11-23 DIAGNOSIS — J45.20 MILD INTERMITTENT ASTHMA WITHOUT COMPLICATION: ICD-10-CM

## 2020-11-23 LAB — SARS-COV-2 RDRP RESP QL NAA+PROBE: NEGATIVE

## 2020-11-23 PROCEDURE — 99999 PR PBB SHADOW E&M-EST. PATIENT-LVL III: ICD-10-PCS | Mod: PBBFAC,,, | Performed by: EMERGENCY MEDICINE

## 2020-11-23 PROCEDURE — 94727 GAS DIL/WSHOT DETER LNG VOL: CPT | Mod: PBBFAC | Performed by: INTERNAL MEDICINE

## 2020-11-23 PROCEDURE — 94729 PR C02/MEMBANE DIFFUSE CAPACITY: ICD-10-PCS | Mod: 26,S$PBB,, | Performed by: INTERNAL MEDICINE

## 2020-11-23 PROCEDURE — 94729 DIFFUSING CAPACITY: CPT | Mod: PBBFAC | Performed by: INTERNAL MEDICINE

## 2020-11-23 PROCEDURE — 99213 OFFICE O/P EST LOW 20 MIN: CPT | Mod: 25,S$PBB,, | Performed by: EMERGENCY MEDICINE

## 2020-11-23 PROCEDURE — 94729 DIFFUSING CAPACITY: CPT | Mod: 26,S$PBB,, | Performed by: INTERNAL MEDICINE

## 2020-11-23 PROCEDURE — 99999 PR PBB SHADOW E&M-EST. PATIENT-LVL III: CPT | Mod: PBBFAC,,, | Performed by: EMERGENCY MEDICINE

## 2020-11-23 PROCEDURE — 94727 GAS DIL/WSHOT DETER LNG VOL: CPT | Mod: 26,S$PBB,, | Performed by: INTERNAL MEDICINE

## 2020-11-23 PROCEDURE — 99213 OFFICE O/P EST LOW 20 MIN: CPT | Mod: PBBFAC,25 | Performed by: EMERGENCY MEDICINE

## 2020-11-23 PROCEDURE — U0002 COVID-19 LAB TEST NON-CDC: HCPCS

## 2020-11-23 PROCEDURE — 94060 EVALUATION OF WHEEZING: CPT | Mod: 26,S$PBB,, | Performed by: INTERNAL MEDICINE

## 2020-11-23 PROCEDURE — 94060 EVALUATION OF WHEEZING: CPT | Mod: PBBFAC | Performed by: INTERNAL MEDICINE

## 2020-11-23 PROCEDURE — 99213 PR OFFICE/OUTPT VISIT, EST, LEVL III, 20-29 MIN: ICD-10-PCS | Mod: 25,S$PBB,, | Performed by: EMERGENCY MEDICINE

## 2020-11-23 PROCEDURE — 94060 PR EVAL OF BRONCHOSPASM: ICD-10-PCS | Mod: 26,S$PBB,, | Performed by: INTERNAL MEDICINE

## 2020-11-23 PROCEDURE — 94727 PR PULM FUNCTION TEST BY GAS: ICD-10-PCS | Mod: 26,S$PBB,, | Performed by: INTERNAL MEDICINE

## 2020-11-23 RX ORDER — ALBUTEROL SULFATE 0.83 MG/ML
2.5 SOLUTION RESPIRATORY (INHALATION) EVERY 6 HOURS PRN
Qty: 300 ML | Refills: 3 | Status: SHIPPED | OUTPATIENT
Start: 2020-11-23

## 2020-11-23 RX ORDER — BUDESONIDE AND FORMOTEROL FUMARATE DIHYDRATE 80; 4.5 UG/1; UG/1
2 AEROSOL RESPIRATORY (INHALATION) 2 TIMES DAILY
Qty: 1 INHALER | Refills: 11 | Status: SHIPPED | OUTPATIENT
Start: 2020-11-23 | End: 2021-12-16

## 2020-11-23 RX ORDER — ALBUTEROL SULFATE 90 UG/1
2 AEROSOL, METERED RESPIRATORY (INHALATION) EVERY 6 HOURS PRN
Qty: 18 G | Refills: 6 | Status: SHIPPED | OUTPATIENT
Start: 2020-11-23

## 2020-11-23 RX ORDER — MONTELUKAST SODIUM 10 MG/1
10 TABLET ORAL NIGHTLY
Qty: 30 TABLET | Refills: 11 | Status: SHIPPED | OUTPATIENT
Start: 2020-11-23 | End: 2022-11-30

## 2020-11-23 RX ORDER — FLUTICASONE PROPIONATE 50 MCG
1 SPRAY, SUSPENSION (ML) NASAL 2 TIMES DAILY
Qty: 18.2 ML | Refills: 11 | Status: SHIPPED | OUTPATIENT
Start: 2020-11-23 | End: 2021-12-16

## 2021-10-15 ENCOUNTER — TELEPHONE (OUTPATIENT)
Dept: PULMONOLOGY | Facility: CLINIC | Age: 71
End: 2021-10-15

## 2021-11-16 DIAGNOSIS — J45.20 MILD INTERMITTENT ASTHMA WITHOUT COMPLICATION: ICD-10-CM

## 2021-11-16 RX ORDER — MONTELUKAST SODIUM 10 MG/1
10 TABLET ORAL NIGHTLY
Qty: 30 TABLET | Refills: 11 | Status: CANCELLED | OUTPATIENT
Start: 2021-11-16

## 2021-11-19 ENCOUNTER — TELEPHONE (OUTPATIENT)
Dept: PULMONOLOGY | Facility: CLINIC | Age: 71
End: 2021-11-19
Payer: MEDICARE

## 2022-02-14 RX ORDER — FLUTICASONE PROPIONATE 50 MCG
1 SPRAY, SUSPENSION (ML) NASAL 2 TIMES DAILY
Qty: 16 G | Refills: 0 | Status: SHIPPED | OUTPATIENT
Start: 2022-02-14 | End: 2022-04-14 | Stop reason: SDUPTHER

## 2022-05-25 ENCOUNTER — TELEPHONE (OUTPATIENT)
Dept: PULMONOLOGY | Facility: CLINIC | Age: 72
End: 2022-05-25
Payer: MEDICARE

## 2022-05-25 NOTE — TELEPHONE ENCOUNTER
Spoke with pt. Dr Srinivasan not in clinic on 5/30/22. R/s to 6/1/22 at 2:30pm with pft's prior. Appt mailed. Pt confirmed and verbazlied understanding.

## 2022-05-25 NOTE — TELEPHONE ENCOUNTER
----- Message from Rosy Johnson sent at 5/25/2022  3:41 PM CDT -----  Regarding: pt  Pt is returning the nurses phone call pt needs to reschedule his appt on 6/1 pt said he has an eye doctors appt that day can you please call pt at [phone].    KEVIN

## 2022-05-26 ENCOUNTER — TELEPHONE (OUTPATIENT)
Dept: PULMONOLOGY | Facility: CLINIC | Age: 72
End: 2022-05-26
Payer: MEDICARE

## 2022-05-26 NOTE — TELEPHONE ENCOUNTER
I spoke with patient. Mr Prabhakar stated he needed to reschedule appointment on 6/1/22 with Dr Srinivasan due to another appointment he has I rescheduled to 6/2/22 at 2pm with pft's prior. Appointment mailed. Patient verbalized understanding.

## 2022-06-02 ENCOUNTER — OFFICE VISIT (OUTPATIENT)
Dept: PULMONOLOGY | Facility: CLINIC | Age: 72
End: 2022-06-02
Payer: MEDICARE

## 2022-06-02 ENCOUNTER — HOSPITAL ENCOUNTER (OUTPATIENT)
Dept: PULMONOLOGY | Facility: CLINIC | Age: 72
Discharge: HOME | End: 2022-06-02
Payer: MEDICARE

## 2022-06-02 VITALS
BODY MASS INDEX: 26.57 KG/M2 | SYSTOLIC BLOOD PRESSURE: 130 MMHG | WEIGHT: 189.81 LBS | HEART RATE: 60 BPM | DIASTOLIC BLOOD PRESSURE: 72 MMHG | HEIGHT: 71 IN | OXYGEN SATURATION: 96 %

## 2022-06-02 DIAGNOSIS — J45.909 ASTHMA, UNSPECIFIED ASTHMA SEVERITY, UNSPECIFIED WHETHER COMPLICATED, UNSPECIFIED WHETHER PERSISTENT: ICD-10-CM

## 2022-06-02 DIAGNOSIS — K21.9 GASTROESOPHAGEAL REFLUX DISEASE, UNSPECIFIED WHETHER ESOPHAGITIS PRESENT: ICD-10-CM

## 2022-06-02 DIAGNOSIS — J45.20 MILD INTERMITTENT ASTHMA WITHOUT COMPLICATION: ICD-10-CM

## 2022-06-02 DIAGNOSIS — J45.909 ASTHMA, UNSPECIFIED ASTHMA SEVERITY, UNSPECIFIED WHETHER COMPLICATED, UNSPECIFIED WHETHER PERSISTENT: Primary | ICD-10-CM

## 2022-06-02 LAB
DLCO SINGLE BREATH LLN: 21.1
DLCO SINGLE BREATH PRE REF: 71.9 %
DLCO SINGLE BREATH REF: 28.03
DLCOC SBVA LLN: 2.7
DLCOC SBVA REF: 3.82
DLCOC SINGLE BREATH LLN: 21.1
DLCOC SINGLE BREATH REF: 28.03
DLCOCSBVAULN: 4.95
DLCOCSINGLEBREATHULN: 34.95
DLCOSINGLEBREATHULN: 34.95
DLCOVA LLN: 2.7
DLCOVA PRE REF: 95.7 %
DLCOVA PRE: 3.66 ML/(MIN*MMHG*L) (ref 2.7–4.95)
DLCOVA REF: 3.82
DLCOVAULN: 4.95
FEF 25 75 LLN: 1.07
FEF 25 75 PRE REF: 168.8 %
FEF 25 75 REF: 2.48
FET100 CHG: 5.5 %
FEV05 LLN: 1.6
FEV05 REF: 2.74
FEV1 CHG: -4.9 %
FEV1 FVC LLN: 62
FEV1 FVC PRE REF: 113.7 %
FEV1 FVC REF: 76
FEV1 LLN: 2.38
FEV1 PRE REF: 103.3 %
FEV1 REF: 3.3
FEV1 VOL CHG: -0.17
FVC CHG: 0.3 %
FVC LLN: 3.25
FVC PRE REF: 90.4 %
FVC REF: 4.38
FVC VOL CHG: 0.01
IVC PRE: 3.91 L (ref 3.25–5.53)
IVC SINGLE BREATH LLN: 3.25
IVC SINGLE BREATH PRE REF: 89.3 %
IVC SINGLE BREATH REF: 4.38
IVCSINGLEBREATHULN: 5.53
PEF LLN: 6.2
PEF PRE REF: 80.5 %
PEF REF: 8.59
PHYSICIAN COMMENT: ABNORMAL
POST FEF 25 75: 3.73 L/S (ref 1.07–4.48)
POST FET 100: 6.37 SEC
POST FEV1 FVC: 81.68 % (ref 62.32–87.63)
POST FEV1: 3.24 L (ref 2.38–4.16)
POST FEV5: 2.79 L (ref 1.6–3.87)
POST FVC: 3.97 L (ref 3.25–5.53)
POST PEF: 9.83 L/S (ref 6.2–10.97)
PRE DLCO: 20.16 ML/(MIN*MMHG) (ref 21.1–34.95)
PRE FEF 25 75: 4.19 L/S (ref 1.07–4.48)
PRE FET 100: 6.04 SEC
PRE FEV05 REF: 95.4 %
PRE FEV1 FVC: 86.1 % (ref 62.32–87.63)
PRE FEV1: 3.41 L (ref 2.38–4.16)
PRE FEV5: 2.61 L (ref 1.6–3.87)
PRE FVC: 3.96 L (ref 3.25–5.53)
PRE PEF: 6.91 L/S (ref 6.2–10.97)
VA PRE: 5.51 L (ref 7.18–7.18)
VA SINGLE BREATH LLN: 7.18
VA SINGLE BREATH PRE REF: 76.7 %
VA SINGLE BREATH REF: 7.18
VASINGLEBREATHULN: 7.18

## 2022-06-02 PROCEDURE — 94729 DIFFUSING CAPACITY: CPT | Mod: PBBFAC | Performed by: INTERNAL MEDICINE

## 2022-06-02 PROCEDURE — 99213 OFFICE O/P EST LOW 20 MIN: CPT | Mod: 25,S$PBB,, | Performed by: EMERGENCY MEDICINE

## 2022-06-02 PROCEDURE — 94060 EVALUATION OF WHEEZING: CPT | Mod: PBBFAC | Performed by: INTERNAL MEDICINE

## 2022-06-02 PROCEDURE — 99999 PR PBB SHADOW E&M-EST. PATIENT-LVL III: ICD-10-PCS | Mod: PBBFAC,,, | Performed by: EMERGENCY MEDICINE

## 2022-06-02 PROCEDURE — 99999 PR PBB SHADOW E&M-EST. PATIENT-LVL III: CPT | Mod: PBBFAC,,, | Performed by: EMERGENCY MEDICINE

## 2022-06-02 PROCEDURE — 94060 EVALUATION OF WHEEZING: CPT | Mod: 26,S$PBB,, | Performed by: INTERNAL MEDICINE

## 2022-06-02 PROCEDURE — 94729 PR C02/MEMBANE DIFFUSE CAPACITY: ICD-10-PCS | Mod: 26,S$PBB,, | Performed by: INTERNAL MEDICINE

## 2022-06-02 PROCEDURE — 99213 PR OFFICE/OUTPT VISIT, EST, LEVL III, 20-29 MIN: ICD-10-PCS | Mod: 25,S$PBB,, | Performed by: EMERGENCY MEDICINE

## 2022-06-02 PROCEDURE — 99213 OFFICE O/P EST LOW 20 MIN: CPT | Mod: PBBFAC,25 | Performed by: EMERGENCY MEDICINE

## 2022-06-02 PROCEDURE — 94060 PR EVAL OF BRONCHOSPASM: ICD-10-PCS | Mod: 26,S$PBB,, | Performed by: INTERNAL MEDICINE

## 2022-06-02 PROCEDURE — 94729 DIFFUSING CAPACITY: CPT | Mod: 26,S$PBB,, | Performed by: INTERNAL MEDICINE

## 2022-06-02 RX ORDER — CYCLOSPORINE 0.5 MG/ML
EMULSION OPHTHALMIC
COMMUNITY
Start: 2022-05-26

## 2022-06-02 NOTE — PROGRESS NOTES
Pulmonary & Critical Care Medicine   Clinic Note        HPI:  New to me. Followed in fellows clinic in past. 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       At his last visit his asthma control was de-escalated and he is doing well.  Today, he is taking symbicort, PRN albuterol, singulair, and flonase.  He also takes a PPI for GERD.  In June, he was treated for bronchitis with azithromycin and then amoxicillin with nebulizer therapy and recovered.      ACT = 21 today     Occupational history - retired   Sick contacts - no  Tb exposure - no  Travel history - no  Pets - dog  Aspiration - no  ITZEL - no     Prior Visit 2020:   Doing well. No hospitalizations. No corticosteroid use. Enjoying [facility]. Remains active. Weight stable. Infrequent rescue albuterol use. Symptoms well controlled. No night time awakening. No additional complaints or perceived respiratory limitations.     INTERVAL HISTORY:   No issues. Breathing stable. Remains active.   No exacerbations/steroids.   Vaccinations UTD.   Essentially no use of rescue albuterol.  Remains on symbicort 80/4.5 + singulair.   Feels great   PFTs completed.           Past Medical History:   Diagnosis Date    Arthritis      Asthma      Diverticulitis      Hyperlipidemia      Hypertension      PONV (postoperative nausea and vomiting)       with first colonoscopy    Reflux              Past Surgical History:   Procedure Laterality Date    COLONOSCOPY W/ POLYPECTOMY         three times      Family/Social History: Reviewed and updated.         Drug Allergies:   Review of patient's allergies indicates:  No Known Allergies     Review of Systems:   A comprehensive 12-point review of systems was performed, and is negative except for  "those items mentioned above in the HPI section of this note.        Vital Signs: /72 (BP Location: Left arm, Patient Position: Sitting)   Pulse 60   Ht 5' 11" (1.803 m)   Wt 86.1 kg (189 lb 13.1 oz)   SpO2 96%   BMI 26.47 kg/m²        Physical Exam:   GEN- NAD AAOx3 Well Built, Well Appearing   HEENT- ATNC, PERRLA, EOMI, OP-Cl. No JVD, LAD or bruit noted. Trachea Midline.   CV- RRR No M/R/G  RESP- CTA-Bilateral   GI- S/NT/ND. Positive BS X 4. No HSM Noted  BACK- Spine midline. No step off, crepitus or deformity noted. No midline TTP.   Ext- MAEW, No deformity. No edema or rashes noted.   Neuro- Strength 5/5 symmetric. CN 2-12 intact. Normal gait. Normal sensation.          Personal Review and Summary of Prior Diagnostics     Laboratory Studies: Reviewed. No labs since 2015      Microbiology Data: None. Bronchoscopy studies 2015- Nl.      Summary of Chest Imaging Personally Reviewed:      CT 2017- .  Postsurgical change of VATS procedure and wedge resection with fibrotic scar and suture material in the superior segment of the right lower lobe. No new lesions are identified.  2. Moderate calcific atherosclerosis of the coronary arteries in a 3 vessel distribution.  3. Several right hepatic lobe hypodensities as well as a right hepatic lobe calcification unchanged in size and appearance from prior examination dated 4/30/2012.     CXR 2018- The lungs are clear, with normal appearance of pulmonary vasculature and no pleural effusion or pneumothorax.  The cardiac silhouette is normal in size. The hilar and mediastinal contours are unremarkable.  Degenerative changes of the spine.     2D Echo: 2015       1 - Normal left ventricular systolic function (EF 60-65%).     2 - Normal left ventricular diastolic function.     3 - Normal right ventricular systolic function .      PFT's:      FEV1/FVC- 80  FEV1- 3.42L (100%)   FVC- 4.25L (95%)   TLC- 79   DLCO- 77         FEV1/FVC- 85  FEV1- 3.35L (100%)  FVC- 3.95L (89%) "   TLC- 70   DLCO- 87      No BD response.     FEV1/FVC- 86   FEV1- 3.41L (103%)   FVC- 3.96L (90%)   DLCO- 71       Assessment:       No diagnosis found.    Outpatient Encounter Medications as of 6/2/2022   Medication Sig Dispense Refill    albuterol (PROVENTIL HFA) 90 mcg/actuation inhaler Inhale 2 puffs into the lungs every 6 (six) hours as needed for Wheezing. Rescue 18 g 6    albuterol (PROVENTIL) 2.5 mg /3 mL (0.083 %) nebulizer solution Take 3 mLs (2.5 mg total) by nebulization every 6 (six) hours as needed for Wheezing. 300 mL 3    fluticasone propionate (FLONASE) 50 mcg/actuation nasal spray Use 1 spray(s) in each nostril twice daily 16 g 0    FLUZONE HIGH-DOSE 2019-20, PF, 180 mcg/0.5 mL Syrg PHARMACIST ADMINISTERED IMMUNIZATION ADMINISTERED AT TIME OF DISPENSING  0    hydrocodone-acetaminophen 5-325mg (NORCO) 5-325 mg per tablet Take 1 tablet by mouth every 6 (six) hours as needed for Pain. 42 tablet 0    lisinopril 10 MG tablet Take 10 mg by mouth once daily.      montelukast (SINGULAIR) 10 mg tablet Take 1 tablet (10 mg total) by mouth every evening. 30 tablet 11    omeprazole (PRILOSEC) 40 MG capsule Take 40 mg by mouth once daily.      PRAVASTATIN SODIUM (PRAVASTATIN ORAL) Take by mouth every evening.       SYMBICORT 80-4.5 mcg/actuation HFAA Inhale 2 puffs by mouth twice daily 11 g 0     No facility-administered encounter medications on file as of 6/2/2022.     No orders of the defined types were placed in this encounter.      Plan:       1. Pulmonary nodules- VATS s/p wedge resection. Granulomatous disease. Cultures unrevealing. Never smoker. No chest imaging since 2018. Monitor   2. Asthma- no recent exacerbations. Doing well. -- De-escalated therapy at last visit and stable. Maintain LABA ICS (symbicort 80) + singulair + prn HEIDI. Vaccinations UTD.   3. Recurrent bronchitis- no evidence of AE today. Asthma management per above   4. GERD- controlled. Continue PPI      PFTs remains stable.  Asymptomatic and no exacerbation history. No routine follow up with me required. Discussed with him.. He can RT clinic prn. PCP should be able to manage mild asthma.      Oscar Srinivasan M.D.   [facility]

## 2023-05-31 DIAGNOSIS — J45.20 MILD INTERMITTENT ASTHMA WITHOUT COMPLICATION: ICD-10-CM

## 2023-05-31 RX ORDER — MONTELUKAST SODIUM 10 MG/1
TABLET ORAL
Qty: 90 TABLET | Refills: 0 | Status: SHIPPED | OUTPATIENT
Start: 2023-05-31 | End: 2023-08-25

## 2023-08-24 DIAGNOSIS — J45.20 MILD INTERMITTENT ASTHMA WITHOUT COMPLICATION: ICD-10-CM

## 2023-08-25 RX ORDER — MONTELUKAST SODIUM 10 MG/1
TABLET ORAL
Qty: 90 TABLET | Refills: 0 | Status: SHIPPED | OUTPATIENT
Start: 2023-08-25 | End: 2023-11-21

## 2023-11-20 DIAGNOSIS — J45.20 MILD INTERMITTENT ASTHMA WITHOUT COMPLICATION: ICD-10-CM

## 2023-11-21 RX ORDER — MONTELUKAST SODIUM 10 MG/1
TABLET ORAL
Qty: 90 TABLET | Refills: 0 | Status: SHIPPED | OUTPATIENT
Start: 2023-11-21

## 2023-12-01 DIAGNOSIS — J45.909 ASTHMA, UNSPECIFIED ASTHMA SEVERITY, UNSPECIFIED WHETHER COMPLICATED, UNSPECIFIED WHETHER PERSISTENT: ICD-10-CM

## 2023-12-03 RX ORDER — BUDESONIDE AND FORMOTEROL FUMARATE DIHYDRATE 80; 4.5 UG/1; UG/1
AEROSOL RESPIRATORY (INHALATION)
Qty: 11 G | Refills: 0 | Status: SHIPPED | OUTPATIENT
Start: 2023-12-03 | End: 2023-12-28

## 2023-12-28 DIAGNOSIS — J45.909 ASTHMA, UNSPECIFIED ASTHMA SEVERITY, UNSPECIFIED WHETHER COMPLICATED, UNSPECIFIED WHETHER PERSISTENT: ICD-10-CM

## 2023-12-28 RX ORDER — BUDESONIDE AND FORMOTEROL FUMARATE DIHYDRATE 80; 4.5 UG/1; UG/1
AEROSOL RESPIRATORY (INHALATION)
Qty: 11 G | Refills: 0 | Status: SHIPPED | OUTPATIENT
Start: 2023-12-28 | End: 2024-02-06

## 2024-01-29 DIAGNOSIS — J30.1 SEASONAL ALLERGIC RHINITIS DUE TO POLLEN: ICD-10-CM

## 2024-01-29 RX ORDER — FLUTICASONE PROPIONATE 50 MCG
SPRAY, SUSPENSION (ML) NASAL
Qty: 16 G | Refills: 0 | Status: SHIPPED | OUTPATIENT
Start: 2024-01-29

## 2024-02-06 DIAGNOSIS — J45.909 ASTHMA, UNSPECIFIED ASTHMA SEVERITY, UNSPECIFIED WHETHER COMPLICATED, UNSPECIFIED WHETHER PERSISTENT: ICD-10-CM

## 2024-02-06 RX ORDER — BUDESONIDE AND FORMOTEROL FUMARATE DIHYDRATE 80; 4.5 UG/1; UG/1
AEROSOL RESPIRATORY (INHALATION)
Qty: 11 G | Refills: 0 | Status: SHIPPED | OUTPATIENT
Start: 2024-02-06